# Patient Record
Sex: FEMALE | Race: WHITE | NOT HISPANIC OR LATINO | URBAN - METROPOLITAN AREA
[De-identification: names, ages, dates, MRNs, and addresses within clinical notes are randomized per-mention and may not be internally consistent; named-entity substitution may affect disease eponyms.]

---

## 2020-01-28 LAB
EXT 24 HR UR METANEPHRINE: NORMAL
EXT 24 HR UR NORMETANEPHRINE: NORMAL
EXT 24 HR UR NORMETANEPHRINE: NORMAL
EXT 25 HYDROXY VIT D2: NORMAL
EXT 25 HYDROXY VIT D3: NORMAL
EXT 5 HIAA 24 HR URINE: 4.2 MG/24H (ref 0–6)
EXT 5 HIAA BLOOD: NORMAL
EXT ACTH: NORMAL
EXT AFP: NORMAL
EXT ALBUMIN: NORMAL
EXT ALKALINE PHOSPHATASE: NORMAL
EXT ALT: NORMAL
EXT AMYLASE: NORMAL
EXT ANTI ISLET CELL AB: NORMAL
EXT ANTI PARIETAL CELL AB: NORMAL
EXT ANTI THYROID AB: NORMAL
EXT AST: NORMAL
EXT BILIRUBIN DIRECT: NORMAL
EXT BILIRUBIN TOTAL: NORMAL
EXT BK VIRUS DNA QN PCR: NORMAL
EXT BUN: NORMAL
EXT C PEPTIDE: NORMAL
EXT CA 125: NORMAL
EXT CA 19-9: NORMAL
EXT CA 27-29: NORMAL
EXT CALCITONIN: NORMAL
EXT CALCIUM: NORMAL
EXT CEA: NORMAL
EXT CHLORIDE: NORMAL
EXT CHOLESTEROL: NORMAL
EXT CHROMOGRANIN A: 87 NG/ML (ref 25–140)
EXT CO2: NORMAL
EXT CREATININE UA: NORMAL
EXT CREATININE: NORMAL
EXT CYCLOSPORONE LEVEL: NORMAL
EXT DOPAMINE: NORMAL
EXT EBV DNA BY PCR: NORMAL
EXT EPINEPHRINE: NORMAL
EXT FOLATE: NORMAL
EXT FREE T3: NORMAL
EXT FREE T4: NORMAL
EXT FSH: NORMAL
EXT GASTRIN RELEASING PEPTIDE: NORMAL
EXT GASTRIN RELEASING PEPTIDE: NORMAL
EXT GASTRIN: NORMAL
EXT GGT: NORMAL
EXT GHRELIN: NORMAL
EXT GLUCAGON: NORMAL
EXT GLUCOSE: NORMAL
EXT GROWTH HORMONE: NORMAL
EXT HCV RNA QUANT PCR: NORMAL
EXT HDL: NORMAL
EXT HEMATOCRIT: NORMAL
EXT HEMOGLOBIN A1C: NORMAL
EXT HEMOGLOBIN: NORMAL
EXT HISTAMINE 24 HR URINE: NORMAL
EXT HISTAMINE: NORMAL
EXT IGF-1: NORMAL
EXT IMMUNKNOW (NON-STIMULATED): NORMAL
EXT IMMUNKNOW (STIMULATED): NORMAL
EXT INR: NORMAL
EXT INSULIN: NORMAL
EXT LANREOTIDE LEVEL: NORMAL
EXT LDH, TOTAL: NORMAL
EXT LDL CHOLESTEROL: NORMAL
EXT LIPASE: NORMAL
EXT MAGNESIUM: NORMAL
EXT METANEPHRINE FREE PLASMA: NORMAL
EXT MOTILIN: NORMAL
EXT NEUROKININ A CAMB: NORMAL
EXT NEUROKININ A ISI: NORMAL
EXT NEUROTENSIN: NORMAL
EXT NOREPINEPHRINE: NORMAL
EXT NORMETANEPHRINE: NORMAL
EXT NSE: NORMAL
EXT OCTREOTIDE LEVEL: NORMAL
EXT PANCREASTATIN CAMB: NORMAL
EXT PANCREASTATIN ISI: NORMAL
EXT PANCREATIC POLYPEPTIDE: NORMAL
EXT PHOSPHORUS: NORMAL
EXT PLATELETS: NORMAL
EXT POTASSIUM: NORMAL
EXT PROGRAF LEVEL: NORMAL
EXT PROLACTIN: NORMAL
EXT PROTEIN TOTAL: NORMAL
EXT PROTEIN UA: NORMAL
EXT PT: NORMAL
EXT PTH, INTACT: NORMAL
EXT PTT: NORMAL
EXT RAPAMUNE LEVEL: NORMAL
EXT SEROTONIN: NORMAL
EXT SODIUM: NORMAL
EXT SOMATOSTATIN: NORMAL
EXT SUBSTANCE P: NORMAL
EXT TRIGLYCERIDES: NORMAL
EXT TRYPTASE: NORMAL
EXT TSH: NORMAL
EXT URIC ACID: NORMAL
EXT URINE AMYLASE U/HR: NORMAL
EXT URINE AMYLASE U/L: NORMAL
EXT VASOACTIVE INTESTINAL POLYPEPTIDE: NORMAL
EXT VITAMIN B12: NORMAL
EXT VMA 24 HR URINE: NORMAL
EXT WBC: NORMAL
NEURON SPECIFIC ENOLASE: NORMAL

## 2020-02-18 ENCOUNTER — TELEPHONE (OUTPATIENT)
Dept: HEMATOLOGY/ONCOLOGY | Facility: CLINIC | Age: 40
End: 2020-02-18

## 2020-02-18 ENCOUNTER — TELEPHONE (OUTPATIENT)
Dept: NEUROLOGY | Facility: HOSPITAL | Age: 40
End: 2020-02-18

## 2020-02-18 ENCOUNTER — NURSE TRIAGE (OUTPATIENT)
Dept: ADMINISTRATIVE | Facility: CLINIC | Age: 40
End: 2020-02-18

## 2020-02-18 DIAGNOSIS — C7A.026 MALIGNANT CARCINOID TUMOR OF RECTUM: Primary | ICD-10-CM

## 2020-02-18 RX ORDER — PROGESTERONE 200 MG/1
200 CAPSULE ORAL DAILY
COMMUNITY

## 2020-02-18 RX ORDER — MELATONIN 3 MG
CAPSULE ORAL
COMMUNITY

## 2020-02-18 RX ORDER — OMEPRAZOLE 10 MG/1
10 CAPSULE, DELAYED RELEASE ORAL DAILY
COMMUNITY

## 2020-02-18 RX ORDER — SUMATRIPTAN 50 MG/1
50 TABLET, FILM COATED ORAL
COMMUNITY

## 2020-02-18 NOTE — TELEPHONE ENCOUNTER
----- Message from Hanny Rice RN sent at 2/18/2020  3:04 PM CST -----  Contact: Jessica Christie (mother)  Hi, This a referral for a newly diagnosed Neuroendocrine tumor.    Hanny  ----- Message -----  From: Bre Escalera  Sent: 2/18/2020   2:27 PM CST  To: [facility]    Ms. Christie is calling to schedule a NP appt.     Diagnosis: Carcinoid tumor    Date of Diagnosis: 02/17/2020  by Dr. Demetrius Benito at [facility] (office).      Ms Christie can be reached at [phone] regarding this message.    Thanks.

## 2020-02-18 NOTE — TELEPHONE ENCOUNTER
Spoke with patient's Mother regarding the referral.  Patient would like an appointment with Neuroendocrine clinic in [place].  Referral forwarded to Neuroendocrine clinical Support.

## 2020-02-18 NOTE — TELEPHONE ENCOUNTER
The patient called in to ask about whether she should have a test performed in another state. The patient was advised per nursing judgment and was advised to call back with questions, concerns or symptoms the patient verbalized understanding.     Reason for Disposition   Nursing judgment    Protocols used: NO GUIDELINE OR REFERENCE [id number]

## 2020-02-18 NOTE — TELEPHONE ENCOUNTER
New NET referral from Dr. Demetrius Benito at [facility].  Pt with rectal carcinoid dx 1/2/2020.  Rectal bx showed a well differentiated neuroendocrine tumor, 3mm, tumor invades into submucosa. Ordered Ga 68 PET/CT scan, rectal EUS and path re read per protocol.  Sent to Wanda to schedule.

## 2020-02-19 NOTE — TELEPHONE ENCOUNTER
----- Message from Jimi Goodman sent at 2/19/2020  9:58 AM CST -----  Contact: pt mother  Attn Karen    Please call pt mother at [phone]    Patient mother is returning staff call regarding a future oncology appt but patient is currently uninsured     Patient mother has questions about being uninsured    Thank you

## 2020-02-26 ENCOUNTER — TELEPHONE (OUTPATIENT)
Dept: NEUROLOGY | Facility: HOSPITAL | Age: 40
End: 2020-02-26

## 2020-02-26 NOTE — TELEPHONE ENCOUNTER
We have received the patients EUS & Flex Sig reports so per Dr. Alfaro we are ok to cancel the GA68. Patient verbalized understanding.

## 2020-03-05 NOTE — PROGRESS NOTES
NOLANETS:  [facility]  A collaboration between [facility] and [facility]      PATIENT: Naomy Wiley  MRN: [id number]  DATE: 3/5/2020    Subjective:      Chief Complaint: Consult for rectal neuroendocrine tumor. Referred by Dr. Demetrius Benito at [facility].  Review most recent imaging and blood work results. Establish surveillance and treatment plan.     Overview / HPI: This nice lady has a rectal neuroendocrine tumor diagnosed 2020.  She underwent a colonoscopy because of a history of colonic polyps in her family.  She had had a prior colonoscopy that did not show any worrisome findings. Her most recent colonoscopy noted a small rectal carcinoid.    Interval History:   She had a follow-up endoscopy with endoscopic ultrasound with re-resection of the scar.  The final pathology was consistent with a well-differentiated rectal carcinoid and no residual neuroendocrine tumor cells on the repeat biopsy.       Recent testing includes tumor markers (2020), Ct abd/pelvis (2020), Ga 68 PET/CT scan (none), echocardiogram (none), flex sig (2020), rectal EUS (2020).    Vitals: There were no vitals taken for this visit. --loss 10 pounds over last 2 months    ECOG Score: 0 - Asymptomatic    Oncologic History:   Oncologic History Rectal net- dx 2020   Oncologic Treatment    Pathology 2020- rectal bx- well diff net, 3mm, tumor invades into submucosa     Past Medical History:  Past Medical History:   Diagnosis Date    Depression     Dyslipidemia     GERD (gastroesophageal reflux disease)     HTN (hypertension)     Insomnia     Migraines     Rectal carcinoid tumor        Past Surgical History:  Past Surgical History:   Procedure Laterality Date     SECTION  ,     LAPAROSCOPIC CHOLECYSTECTOMY  2015    TONSILLECTOMY      TYMPANOSTOMY TUBE PLACEMENT         Family History:  No family  history on file.    Allergies:  Carafate [sucralfate]; Cefzil [cefprozil]; Epinephrine; and Latex, natural rubber    Medications:  Current Outpatient Medications   Medication Sig    melatonin 3 mg Cap Take by mouth.    multivitamin capsule Take 1 capsule by mouth once daily.    omeprazole (PRILOSEC) 10 MG capsule Take 10 mg by mouth once daily.    progesterone (PROMETRIUM) 200 MG capsule Take 200 mg by mouth once daily.    sumatriptan (IMITREX) 50 MG tablet Take 50 mg by mouth every 2 (two) hours as needed for Migraine.     No current facility-administered medications for this visit.         Review of Systems   Constitutional: Negative.    HENT: Negative.    Eyes: Negative.    Respiratory: Negative.    Cardiovascular: Negative.    Gastrointestinal: Negative.    Endocrine: Negative.    Genitourinary: Negative.    Musculoskeletal: Negative.    Skin: Negative.    Allergic/Immunologic: Negative.    Neurological: Negative.    Hematological: Negative.    Psychiatric/Behavioral: The patient is nervous/anxious.           Objective:      Physical Exam   Constitutional: She is oriented to person, place, and time. She appears well-developed and well-nourished. No distress.   HENT:   Head: Normocephalic.   Eyes: Pupils are equal, round, and reactive to light.   Cardiovascular: Normal rate.   Pulmonary/Chest: No stridor. No respiratory distress.   Musculoskeletal: She exhibits no edema.   Neurological: She is alert and oriented to person, place, and time.   Skin: Skin is warm and dry. She is not diaphoretic.   Psychiatric: She has a normal mood and affect. Her behavior is normal. Judgment and thought content normal.        Laboratory Data:    Neuroendocrine Labs Latest Ref Rng & Units 3/9/2020 3/9/2020   EXT 5 HIAA 24 HR URINE 0 - 6.0 mg/24h     EXT CHROMOGRANIN A 25 - 140 ng/mL       Neuroendocrine Labs Latest Ref Rng & Units 1/28/2020   EXT 5 HIAA 24 HR URINE 0 - 6.0 mg/24h 4.2   EXT CHROMOGRANIN A 25 - 140 ng/mL 87      Scans:     CT abd/pelvis w contrast- 1/22/20       Colonoscopy- 1/2/20          Flex sig- 2/4/20        Rectal EUS- 2/4/20              Assessment/Impression:         Problem List Items Addressed This Visit     None           Plan:         I had a long conversation with the patient her family about the features of her case that support the treatment and surveillance recommendations outlined below:  1.  She had a well-differentiated subcentimeter rectal carcinoid excised endoscopically in January this year.  Her repeat endoscopy with an endoscopic ultrasound showed no suspicious perirectal lymphadenopathy, and a re-biopsy of the prior resection bed showed no evidence of additional neuroendocrine tumor cells.  I discussed the natural history of rectal carcinoids.  The vast majority of these tumors are noninvasive, rarely spread to local regional lymph nodes, and even more rarely spread to distant metastatic sites. She had no pathologic features that would put her at above-average risk of developing local regional lymphadenopathy or distant metastases.  2.  Endoscopic surveillance is by far the most effective method of surveillance in patients with rectal carcinoids.  The endoscopy with endoscopic ultrasound allows us to evaluate the entirety of the remnant rectum including occult abnormalities within the submucosal layer. It also allows us to evaluate and biopsy regional lymph nodes if necessary.  I would recommend a repeat lower endoscopy with endoscopic ultrasound in 6 months.  3.  She had a CT scan the abdomen pelvis that shows no abnormalities that would suggest lymph node or liver metastases.  The value of the CT in patients with low-grade rectal carcinoids is as a baseline to which future scans could be compared.  In this case it is a pertinent negative but not unexpected.  4.  Someone discussed getting a gallium 68 PET.  A somatostatin receptor PET is quite valuable in patients that have a moderate  likelihood of additional tumors or when symptoms are out of proportion to that which we have found on endoscopy, within her blood work, or cross-sectional imaging.  Although I believe a gallium 68 PET is a valuable negative in someone with a diagnosis of carcinoid tumor, she has been extensively evaluated up to this point.  I would hold on PET imaging until a later date.  I discussed the fact that there are handful of reasons I would reconsider somatostatin receptor PET imagin.  New or worsening symptoms suggestive of a neuroendocrine syndrome, 2.  Changes in blood work that would suggest an evolution of a neuroendocrine pathophysiology, or 3.  Findings on endoscopy that would suggest multifocal disease emergence that would be unusual for carcinoid in this location.    She is coming from [place]. [place] has no Center of Excellence for the care of neuroendocrine patients.  We have often leveraged for patients in her situation to allow for in network coverage for what are considered out of network benefits.  I believe we will be able to partner with 1 or more providers close to her home for ongoing surveillance.  She will remain an established patient with access to all of the resources available within our multi-disciplinary clinic.    Endoscopy with endoscopic ultrasound in 6 months  Neuroendocrine blood work in 6 months  Hold on CT scan  Hold on gallium 68 PET    Almaz Alfaro MD, MPH, FACS  Professor of Surgery, [facility]  Neuroendocrine Surgery, Hepatic/Pancreatic & General Surgical Oncology  [address]  [place]  [address]  ph. [phone]; [phone]  fax. [phone]

## 2020-03-09 ENCOUNTER — OFFICE VISIT (OUTPATIENT)
Dept: NEUROLOGY | Facility: HOSPITAL | Age: 40
End: 2020-03-09
Attending: SURGERY

## 2020-03-09 VITALS
HEIGHT: 65 IN | WEIGHT: 161.19 LBS | SYSTOLIC BLOOD PRESSURE: 141 MMHG | TEMPERATURE: 99 F | HEART RATE: 104 BPM | RESPIRATION RATE: 16 BRPM | DIASTOLIC BLOOD PRESSURE: 97 MMHG | BODY MASS INDEX: 26.85 KG/M2

## 2020-03-09 DIAGNOSIS — C7A.026 MALIGNANT CARCINOID TUMOR OF RECTUM: ICD-10-CM

## 2020-03-09 PROBLEM — D3A.026 RECTAL CARCINOID TUMOR: Status: ACTIVE | Noted: 2020-03-09

## 2020-03-09 PROCEDURE — 99215 OFFICE O/P EST HI 40 MIN: CPT | Performed by: SURGERY

## 2020-03-09 RX ORDER — MELATONIN 5 MG
CAPSULE ORAL
COMMUNITY

## 2020-03-09 RX ORDER — UBIDECARENONE 75 MG
CAPSULE ORAL
COMMUNITY

## 2020-03-09 RX ORDER — IBUPROFEN 100 MG/5ML
SUSPENSION, ORAL (FINAL DOSE FORM) ORAL
COMMUNITY

## 2020-03-09 RX ORDER — FAMOTIDINE 20 MG/1
TABLET, FILM COATED ORAL
COMMUNITY

## 2020-03-09 RX ORDER — SUMATRIPTAN SUCCINATE 100 MG/1
1 TABLET ORAL
COMMUNITY

## 2020-03-09 RX ORDER — PYRIDOXINE HCL (VITAMIN B6) 100 MG
TABLET ORAL
COMMUNITY

## 2020-03-09 RX ORDER — SERTRALINE HYDROCHLORIDE 50 MG/1
TABLET, FILM COATED ORAL
COMMUNITY

## 2020-03-09 RX ORDER — OMEPRAZOLE 20 MG/1
CAPSULE, DELAYED RELEASE ORAL
COMMUNITY

## 2020-03-09 RX ORDER — CYANOCOBALAMIN (VITAMIN B-12) 1000 MCG
TABLET, EXTENDED RELEASE ORAL
COMMUNITY

## 2020-03-09 RX ORDER — ASCORBIC ACID 1000 MG
TABLET ORAL
COMMUNITY
End: 2022-03-30

## 2020-03-09 RX ORDER — FOLIC ACID 0.4 MG
TABLET ORAL
COMMUNITY

## 2020-03-09 RX ORDER — PERPHENAZINE/AMITRIPTYLINE HCL 4MG-10MG
TABLET ORAL
COMMUNITY
End: 2022-03-30

## 2020-03-09 NOTE — PATIENT INSTRUCTIONS
Ca will call you tomorrow about a follow up    ------------------------------------------------------------------------------------------------------------------------------------------  Updated: 3/30/20    Blood work / Lab work / Tumor markers: due every 6 mos.  --- due July 2020  Get lab work drawn at least 1 month prior to appointment.    Scans:   Hold on CT scan  Hold on gallium 68 PET     Return Clinic Appointment: in 6 months with Dr. Alfaro - patient to call to schedule an appointment    Echo: not needed at this time    EGD with EUS: due in 6 months --- orders mailed to home address

## 2020-07-24 LAB
EXT 24 HR UR METANEPHRINE: ABNORMAL
EXT 24 HR UR NORMETANEPHRINE: ABNORMAL
EXT 24 HR UR NORMETANEPHRINE: ABNORMAL
EXT 25 HYDROXY VIT D2: ABNORMAL
EXT 25 HYDROXY VIT D3: ABNORMAL
EXT 5 HIAA 24 HR URINE: ABNORMAL
EXT 5 HIAA BLOOD: ABNORMAL
EXT ACTH: ABNORMAL
EXT AFP: ABNORMAL
EXT ALBUMIN: ABNORMAL
EXT ALKALINE PHOSPHATASE: ABNORMAL
EXT ALT: ABNORMAL
EXT AMYLASE: ABNORMAL
EXT ANTI ISLET CELL AB: ABNORMAL
EXT ANTI PARIETAL CELL AB: ABNORMAL
EXT ANTI THYROID AB: ABNORMAL
EXT AST: ABNORMAL
EXT BILIRUBIN DIRECT: ABNORMAL
EXT BILIRUBIN TOTAL: ABNORMAL
EXT BK VIRUS DNA QN PCR: ABNORMAL
EXT BUN: ABNORMAL
EXT C PEPTIDE: ABNORMAL
EXT CA 125: ABNORMAL
EXT CA 19-9: ABNORMAL
EXT CA 27-29: ABNORMAL
EXT CALCITONIN: ABNORMAL
EXT CALCIUM: ABNORMAL
EXT CEA: ABNORMAL
EXT CHLORIDE: ABNORMAL
EXT CHOLESTEROL: ABNORMAL
EXT CHROMOGRANIN A: ABNORMAL
EXT CO2: ABNORMAL
EXT CREATININE UA: ABNORMAL
EXT CREATININE: ABNORMAL
EXT CYCLOSPORONE LEVEL: ABNORMAL
EXT DOPAMINE: ABNORMAL
EXT EBV DNA BY PCR: ABNORMAL
EXT EPINEPHRINE: ABNORMAL
EXT FOLATE: ABNORMAL
EXT FREE T3: ABNORMAL
EXT FREE T4: ABNORMAL
EXT FSH: ABNORMAL
EXT GASTRIN RELEASING PEPTIDE: ABNORMAL
EXT GASTRIN RELEASING PEPTIDE: ABNORMAL
EXT GASTRIN: ABNORMAL
EXT GGT: ABNORMAL
EXT GHRELIN: ABNORMAL
EXT GLUCAGON: ABNORMAL
EXT GLUCOSE: ABNORMAL
EXT GROWTH HORMONE: ABNORMAL
EXT HCV RNA QUANT PCR: ABNORMAL
EXT HDL: ABNORMAL
EXT HEMATOCRIT: ABNORMAL
EXT HEMOGLOBIN A1C: ABNORMAL
EXT HEMOGLOBIN: ABNORMAL
EXT HISTAMINE 24 HR URINE: ABNORMAL
EXT HISTAMINE: ABNORMAL
EXT IGF-1: ABNORMAL
EXT IMMUNKNOW (NON-STIMULATED): ABNORMAL
EXT IMMUNKNOW (STIMULATED): ABNORMAL
EXT INR: ABNORMAL
EXT INSULIN: ABNORMAL
EXT LANREOTIDE LEVEL: ABNORMAL
EXT LDH, TOTAL: ABNORMAL
EXT LDL CHOLESTEROL: ABNORMAL
EXT LIPASE: ABNORMAL
EXT MAGNESIUM: ABNORMAL
EXT METANEPHRINE FREE PLASMA: ABNORMAL
EXT MOTILIN: ABNORMAL
EXT NEUROKININ A CAMB: ABNORMAL
EXT NEUROKININ A ISI: ABNORMAL
EXT NEUROTENSIN: ABNORMAL
EXT NOREPINEPHRINE: ABNORMAL
EXT NORMETANEPHRINE: ABNORMAL
EXT NSE: ABNORMAL
EXT OCTREOTIDE LEVEL: ABNORMAL
EXT PANCREASTATIN CAMB: ABNORMAL
EXT PANCREASTATIN ISI: 526 PG/ML (ref 100–288.7)
EXT PANCREATIC POLYPEPTIDE: ABNORMAL
EXT PHOSPHORUS: ABNORMAL
EXT PLATELETS: ABNORMAL
EXT POTASSIUM: ABNORMAL
EXT PROGRAF LEVEL: ABNORMAL
EXT PROLACTIN: ABNORMAL
EXT PROTEIN TOTAL: ABNORMAL
EXT PROTEIN UA: ABNORMAL
EXT PT: ABNORMAL
EXT PTH, INTACT: ABNORMAL
EXT PTT: ABNORMAL
EXT RAPAMUNE LEVEL: ABNORMAL
EXT SEROTONIN: <10 NG/ML (ref 56–244)
EXT SODIUM: ABNORMAL
EXT SOMATOSTATIN: ABNORMAL
EXT SUBSTANCE P: ABNORMAL
EXT TRIGLYCERIDES: ABNORMAL
EXT TRYPTASE: ABNORMAL
EXT TSH: ABNORMAL
EXT URIC ACID: ABNORMAL
EXT URINE AMYLASE U/HR: ABNORMAL
EXT URINE AMYLASE U/L: ABNORMAL
EXT VASOACTIVE INTESTINAL POLYPEPTIDE: ABNORMAL
EXT VITAMIN B12: ABNORMAL
EXT VMA 24 HR URINE: ABNORMAL
EXT WBC: ABNORMAL
NEURON SPECIFIC ENOLASE: ABNORMAL

## 2020-08-21 LAB
EXT 24 HR UR METANEPHRINE: ABNORMAL
EXT 24 HR UR NORMETANEPHRINE: ABNORMAL
EXT 24 HR UR NORMETANEPHRINE: ABNORMAL
EXT 25 HYDROXY VIT D2: ABNORMAL
EXT 25 HYDROXY VIT D3: ABNORMAL
EXT 5 HIAA 24 HR URINE: ABNORMAL
EXT 5 HIAA BLOOD: ABNORMAL
EXT ACTH: ABNORMAL
EXT AFP: ABNORMAL
EXT ALBUMIN: ABNORMAL
EXT ALKALINE PHOSPHATASE: ABNORMAL
EXT ALT: ABNORMAL
EXT AMYLASE: ABNORMAL
EXT ANTI ISLET CELL AB: ABNORMAL
EXT ANTI PARIETAL CELL AB: ABNORMAL
EXT ANTI THYROID AB: ABNORMAL
EXT AST: ABNORMAL
EXT BILIRUBIN DIRECT: ABNORMAL
EXT BILIRUBIN TOTAL: ABNORMAL
EXT BK VIRUS DNA QN PCR: ABNORMAL
EXT BUN: ABNORMAL
EXT C PEPTIDE: ABNORMAL
EXT CA 125: ABNORMAL
EXT CA 19-9: ABNORMAL
EXT CA 27-29: ABNORMAL
EXT CALCITONIN: ABNORMAL
EXT CALCIUM: ABNORMAL
EXT CEA: ABNORMAL
EXT CHLORIDE: ABNORMAL
EXT CHOLESTEROL: ABNORMAL
EXT CHROMOGRANIN A: ABNORMAL
EXT CO2: ABNORMAL
EXT CREATININE UA: ABNORMAL
EXT CREATININE: ABNORMAL
EXT CYCLOSPORONE LEVEL: ABNORMAL
EXT DOPAMINE: ABNORMAL
EXT EBV DNA BY PCR: ABNORMAL
EXT EPINEPHRINE: ABNORMAL
EXT FOLATE: ABNORMAL
EXT FREE T3: ABNORMAL
EXT FREE T4: ABNORMAL
EXT FSH: ABNORMAL
EXT GASTRIN RELEASING PEPTIDE: ABNORMAL
EXT GASTRIN RELEASING PEPTIDE: ABNORMAL
EXT GASTRIN: ABNORMAL
EXT GGT: ABNORMAL
EXT GHRELIN: ABNORMAL
EXT GLUCAGON: ABNORMAL
EXT GLUCOSE: ABNORMAL
EXT GROWTH HORMONE: ABNORMAL
EXT HCV RNA QUANT PCR: ABNORMAL
EXT HDL: ABNORMAL
EXT HEMATOCRIT: ABNORMAL
EXT HEMOGLOBIN A1C: ABNORMAL
EXT HEMOGLOBIN: ABNORMAL
EXT HISTAMINE 24 HR URINE: ABNORMAL
EXT HISTAMINE: ABNORMAL
EXT IGF-1: ABNORMAL
EXT IMMUNKNOW (NON-STIMULATED): ABNORMAL
EXT IMMUNKNOW (STIMULATED): ABNORMAL
EXT INR: ABNORMAL
EXT INSULIN: ABNORMAL
EXT LANREOTIDE LEVEL: ABNORMAL
EXT LDH, TOTAL: ABNORMAL
EXT LDL CHOLESTEROL: ABNORMAL
EXT LIPASE: ABNORMAL
EXT MAGNESIUM: ABNORMAL
EXT METANEPHRINE FREE PLASMA: ABNORMAL
EXT MOTILIN: ABNORMAL
EXT NEUROKININ A CAMB: ABNORMAL
EXT NEUROKININ A ISI: ABNORMAL
EXT NEUROTENSIN: ABNORMAL
EXT NOREPINEPHRINE: ABNORMAL
EXT NORMETANEPHRINE: ABNORMAL
EXT NSE: ABNORMAL
EXT OCTREOTIDE LEVEL: ABNORMAL
EXT PANCREASTATIN CAMB: ABNORMAL
EXT PANCREASTATIN ISI: 54 PG/ML (ref 73–349)
EXT PANCREATIC POLYPEPTIDE: ABNORMAL
EXT PHOSPHORUS: ABNORMAL
EXT PLATELETS: ABNORMAL
EXT POTASSIUM: ABNORMAL
EXT PROGRAF LEVEL: ABNORMAL
EXT PROLACTIN: ABNORMAL
EXT PROTEIN TOTAL: ABNORMAL
EXT PROTEIN UA: ABNORMAL
EXT PT: ABNORMAL
EXT PTH, INTACT: ABNORMAL
EXT PTT: ABNORMAL
EXT RAPAMUNE LEVEL: ABNORMAL
EXT SEROTONIN: 11 NG/ML (ref 0–420)
EXT SODIUM: ABNORMAL
EXT SOMATOSTATIN: ABNORMAL
EXT SUBSTANCE P: ABNORMAL
EXT TRIGLYCERIDES: ABNORMAL
EXT TRYPTASE: ABNORMAL
EXT TSH: ABNORMAL
EXT URIC ACID: ABNORMAL
EXT URINE AMYLASE U/HR: ABNORMAL
EXT URINE AMYLASE U/L: ABNORMAL
EXT VASOACTIVE INTESTINAL POLYPEPTIDE: ABNORMAL
EXT VITAMIN B12: ABNORMAL
EXT VMA 24 HR URINE: ABNORMAL
EXT WBC: ABNORMAL
NEURON SPECIFIC ENOLASE: ABNORMAL

## 2020-09-16 ENCOUNTER — PATIENT MESSAGE (OUTPATIENT)
Dept: NEUROLOGY | Facility: HOSPITAL | Age: 40
End: 2020-09-16

## 2020-10-06 ENCOUNTER — PATIENT MESSAGE (OUTPATIENT)
Dept: NEUROLOGY | Facility: HOSPITAL | Age: 40
End: 2020-10-06

## 2020-10-21 ENCOUNTER — PATIENT MESSAGE (OUTPATIENT)
Dept: NEUROLOGY | Facility: HOSPITAL | Age: 40
End: 2020-10-21

## 2020-10-21 ENCOUNTER — TELEPHONE (OUTPATIENT)
Dept: NEUROLOGY | Facility: HOSPITAL | Age: 40
End: 2020-10-21

## 2020-10-21 NOTE — TELEPHONE ENCOUNTER
----- Message from Zora Silverio sent at 10/21/2020  9:49 AM CDT -----  Contact: self [phone]  MM - Patient is returning your call. Please call

## 2020-10-30 ENCOUNTER — OFFICE VISIT (OUTPATIENT)
Dept: NEUROLOGY | Facility: HOSPITAL | Age: 40
End: 2020-10-30
Attending: SURGERY

## 2020-10-30 DIAGNOSIS — C7A.026 MALIGNANT CARCINOID TUMOR OF RECTUM: Primary | ICD-10-CM

## 2020-10-30 NOTE — PROGRESS NOTES
NOLANETS:  [facility]  A collaboration between [facility] and [facility]      PATIENT: Naomy Wiley  MRN: [id number]  DATE: 10/30/2020    Subjective:      Chief Complaint: Consult for rectal neuroendocrine tumor. Referred by Dr. Demetrius Benito at [facility].  Review most recent imaging and blood work results. Establish surveillance and treatment plan.     Overview / HPI: This nice lady has a rectal neuroendocrine tumor diagnosed 2020.  She underwent a colonoscopy because of a history of colonic polyps in her family.  She had had a prior colonoscopy that did not show any worrisome findings. Her most recent colonoscopy noted a small rectal carcinoid.    Interval History:   She had a follow-up endoscopy with endoscopic ultrasound with re-resection of the scar.  The final pathology was consistent with a well-differentiated rectal carcinoid and no residual neuroendocrine tumor cells on the repeat biopsy.       Vitals: There were no vitals taken for this visit. --loss 10 pounds over last 2 months    ECOG Score: 0 - Asymptomatic    Oncologic History:   Oncologic History Rectal net- dx 2020   Oncologic Treatment    Pathology 2020- rectal bx- well diff net, 3mm, tumor invades into submucosa     Past Medical History:  Past Medical History:   Diagnosis Date    Depression     Dyslipidemia     GERD (gastroesophageal reflux disease)     HTN (hypertension)     Insomnia     Migraines     Rectal carcinoid tumor 2020    well diff, 3mm       Past Surgical History:  Past Surgical History:   Procedure Laterality Date     SECTION  ,     LAPAROSCOPIC CHOLECYSTECTOMY  2015    TONSILLECTOMY      TYMPANOSTOMY TUBE PLACEMENT         Family History:  No family history on file.    Allergies:  Carafate [sucralfate]; Cefzil [cefprozil]; Epinephrine; and Latex, natural  rubber    Medications:  Current Outpatient Medications   Medication Sig    ascorbic acid, vitamin C, (VITAMIN C) 1000 MG tablet     coconut oil 1,000 mg Cap     cyanocobalamin (B-12 DOTS) 500 MCG tablet     ergocalciferol, vitamin D2, (VITAMIN D ORAL)     famotidine (PEPCID) 20 MG tablet     folic acid (FOLVITE) 400 MCG tablet     Lactobac no.41/Bifidobact no.7 (PROBIOTIC-10 ORAL)     melatonin 3 mg Cap Take by mouth.    melatonin 5 mg Cap     milk thistle 175 mg tablet     multivitamin (MULTIPLE VITAMINS DAILY ORAL) Take 2 capsules by mouth.    multivitamin capsule Take 1 capsule by mouth once daily.    omega 3-dha-epa-fish oil (FISH OIL) [phone] mg CpDR     omeprazole (PRILOSEC) 10 MG capsule Take 10 mg by mouth once daily.    omeprazole (PRILOSEC) 20 MG capsule     progesterone (PROMETRIUM) 200 MG capsule Take 200 mg by mouth once daily.    pyridoxine, vitamin B6, (B-6) 100 MG Tab     selenium 200 mcg Cap     sertraline (ZOLOFT) 50 MG tablet     sumatriptan (IMITREX) 100 MG tablet Take 1 tablet by mouth.    sumatriptan (IMITREX) 50 MG tablet Take 50 mg by mouth every 2 (two) hours as needed for Migraine.    TURMERIC ORAL      No current facility-administered medications for this visit.         Review of Systems   Constitutional: Negative for fever and unexpected weight change.   HENT: Negative for facial swelling and hearing loss.    Eyes: Negative for photophobia and visual disturbance.   Respiratory: Negative for shortness of breath and stridor.    Cardiovascular: Negative for palpitations.   Gastrointestinal: Negative for abdominal distention, nausea and vomiting.   Endocrine: Negative for cold intolerance.   Genitourinary: Negative for difficulty urinating and dysuria.   Musculoskeletal: Negative for arthralgias and back pain.   Skin: Negative for rash and wound.   Allergic/Immunologic: Negative for food allergies and immunocompromised state.   Neurological: Negative for tremors  and weakness.   Hematological: Does not bruise/bleed easily.   Psychiatric/Behavioral: The patient is nervous/anxious.           Objective:      Physical Exam  Constitutional:       General: She is not in acute distress.     Appearance: She is well-developed. She is not diaphoretic.   HENT:      Head: Normocephalic.   Eyes:      Pupils: Pupils are equal, round, and reactive to light.   Cardiovascular:      Rate and Rhythm: Normal rate.   Pulmonary:      Effort: No respiratory distress.      Breath sounds: No stridor.   Skin:     General: Skin is warm and dry.   Neurological:      Mental Status: She is alert and oriented to person, place, and time.   Psychiatric:         Mood and Affect: Mood normal.         Behavior: Behavior normal.         Thought Content: Thought content normal.         Judgment: Judgment normal.          Laboratory Data:    Neuroendocrine Labs Latest Ref Rng & Units 8/21/2020 7/24/2020   EXT SEROTONIN 0 - 420 ng/mL 11 <10   EXT CHROMOGRANIN A 25 - 140 ng/mL     EXT PANCREASTATIN AIDA 73 - 349 pg/mL 54 (A) 526.0 (A)     pancreastatin natalio labs 7/2020      pancreastatin AIDA 8/2020    **  Neuroendocrine Labs Latest Ref Rng & Units 1/28/2020   EXT 5 HIAA 24 HR URINE 0 - 6.0 mg/24h 4.2   EXT CHROMOGRANIN A 25 - 140 ng/mL 87       Lower endoscopy/EUS/Pathology    Lower EUS 8/2020      Sigmoidoscopy 8/2020        Scans:     CT abd/pelvis w contrast- 1/22/20       Colonoscopy- 1/2/20          Flex sig- 2/4/20        Rectal EUS- 2/4/20              Assessment/Impression:         Problem List Items Addressed This Visit     None           Plan:         I had a long conversation with the patient her family about the features of her case that support the treatment and surveillance recommendations outlined below:  1.  She and I have previously met.  At that time I reviewed with her the natural history of a subcentimeter, well-differentiated rectal carcinoid tumor.  The vast majority of these tumors in  this area are noninvasive, rarely spread to local regional lymph nodes, and even more rarely spread to distant sites.  She had no pathologic features that would put her at above-average risk for local regional recurrence of this tumor.  However her risk of developing additional tumors within the remnant rectum would be the same 's of risk that contributed to the index tumor.  Given her age and no other comorbid conditions that are clear drivers of her mortality, the surveillance recommendations would be annual lower endoscopy up to 5 years after which time we would reassess future surveillance based on any findings.    2.  She had repeat lower endoscopy, evaluation of the prior resection site, and she had no evidence of residual disease or the emergence of additional tumors within the remnant rectum.  Her next lower endoscopy would be scheduled for 1 year    3.  She had a CT scan the abdomen pelvis done previously that shows no abnormalities that would suggest lymph node or liver metastases.  The value of the CT in patients with low-grade rectal carcinoids is as a baseline to which future scans could be compared.  In this case it is a pertinent negative but not unexpected.    She is coming from [place]. [place] has no Center of Excellence for the care of neuroendocrine patients.  We have often leveraged for patients in her situation to allow for in network coverage for what are considered out of network benefits.  She feels comfortable maintaining a virtual relationship and having her surveillance testing and endoscopy done closer to home.  I have cut and pasted the different neuroendocrine lab results to confirm that the initial value from July 2020 was likely related to problems within [facility] lab.  The more reliable lab, AIDA, repeated it.  It is within the normal range.  This is congruent with her endoscopy and cross-sectional imaging suggesting no evidence of active disease.  We need to request that  her future neuroendocrine blood work go to [facility].    Sigmoidoscopy in 1 year.  I do not believe an EUS is necessary unless there are new findings on lower endoscopy.  Neuroendocrine blood work in 1 year  Add Dr Aponte to her care team  Continue to hold on CT cross-sectional imaging  Continue to hold on gallium 68 PET      Almaz Alfaro MD, MPH, FACS  Professor of Surgery, [facility]  Neuroendocrine Surgery, Hepatic/Pancreatic & General Surgical Oncology  [address]  [place]  [address]  ph. [phone]; [phone]  fax. [phone]

## 2021-07-23 ENCOUNTER — PATIENT MESSAGE (OUTPATIENT)
Dept: NEUROLOGY | Facility: HOSPITAL | Age: 41
End: 2021-07-23

## 2021-09-05 ENCOUNTER — PATIENT MESSAGE (OUTPATIENT)
Dept: NEUROLOGY | Facility: HOSPITAL | Age: 41
End: 2021-09-05

## 2021-09-23 ENCOUNTER — TELEPHONE (OUTPATIENT)
Dept: NEUROLOGY | Facility: HOSPITAL | Age: 41
End: 2021-09-23

## 2021-10-05 LAB
EXT 24 HR UR METANEPHRINE: ABNORMAL
EXT 24 HR UR NORMETANEPHRINE: ABNORMAL
EXT 24 HR UR NORMETANEPHRINE: ABNORMAL
EXT 25 HYDROXY VIT D2: ABNORMAL
EXT 25 HYDROXY VIT D3: ABNORMAL
EXT 5 HIAA 24 HR URINE: ABNORMAL
EXT 5 HIAA BLOOD: ABNORMAL
EXT ACTH: ABNORMAL
EXT AFP: ABNORMAL
EXT ALBUMIN: 4.6 G/DL
EXT ALKALINE PHOSPHATASE: 73 IU/L (ref 44–121)
EXT ALT: 19 IU/L
EXT AMYLASE: ABNORMAL
EXT ANTI ISLET CELL AB: ABNORMAL
EXT ANTI PARIETAL CELL AB: ABNORMAL
EXT ANTI THYROID AB: ABNORMAL
EXT AST: 21 IU/L (ref 0–40)
EXT BILIRUBIN DIRECT: ABNORMAL
EXT BILIRUBIN TOTAL: 0.4 MG/DL (ref 0–1.2)
EXT BK VIRUS DNA QN PCR: ABNORMAL
EXT BUN: 7 MG/DL
EXT C PEPTIDE: ABNORMAL
EXT CA 125: ABNORMAL
EXT CA 19-9: ABNORMAL
EXT CA 27-29: ABNORMAL
EXT CALCITONIN: ABNORMAL
EXT CALCIUM: 9.4 MG/DL
EXT CEA: ABNORMAL
EXT CHLORIDE: 103 MMOL/L (ref 96–106)
EXT CHOLESTEROL: ABNORMAL
EXT CHROMOGRANIN A: ABNORMAL
EXT CO2: 20 MMOL/L (ref 20–29)
EXT CREATININE UA: ABNORMAL
EXT CREATININE: 0.83 MG/DL
EXT CYCLOSPORONE LEVEL: ABNORMAL
EXT DOPAMINE: ABNORMAL
EXT EBV DNA BY PCR: ABNORMAL
EXT EPINEPHRINE: ABNORMAL
EXT FOLATE: ABNORMAL
EXT FREE T3: ABNORMAL
EXT FREE T4: ABNORMAL
EXT FSH: ABNORMAL
EXT GASTRIN RELEASING PEPTIDE: ABNORMAL
EXT GASTRIN RELEASING PEPTIDE: ABNORMAL
EXT GASTRIN: ABNORMAL
EXT GGT: ABNORMAL
EXT GHRELIN: ABNORMAL
EXT GLUCAGON: ABNORMAL
EXT GLUCOSE: 108 MG/DL (ref 65–99)
EXT GROWTH HORMONE: ABNORMAL
EXT HCV RNA QUANT PCR: ABNORMAL
EXT HDL: ABNORMAL
EXT HEMATOCRIT: ABNORMAL
EXT HEMOGLOBIN A1C: ABNORMAL
EXT HEMOGLOBIN: ABNORMAL
EXT HISTAMINE 24 HR URINE: ABNORMAL
EXT HISTAMINE: ABNORMAL
EXT IGF-1: ABNORMAL
EXT IMMUNKNOW (NON-STIMULATED): ABNORMAL
EXT IMMUNKNOW (STIMULATED): ABNORMAL
EXT INR: ABNORMAL
EXT INSULIN: ABNORMAL
EXT LANREOTIDE LEVEL: ABNORMAL
EXT LDH, TOTAL: ABNORMAL
EXT LDL CHOLESTEROL: ABNORMAL
EXT LIPASE: ABNORMAL
EXT MAGNESIUM: ABNORMAL
EXT METANEPHRINE FREE PLASMA: ABNORMAL
EXT MOTILIN: ABNORMAL
EXT NEUROKININ A CAMB: ABNORMAL
EXT NEUROKININ A ISI: ABNORMAL
EXT NEUROTENSIN: ABNORMAL
EXT NOREPINEPHRINE: ABNORMAL
EXT NORMETANEPHRINE: ABNORMAL
EXT NSE: ABNORMAL
EXT OCTREOTIDE LEVEL: ABNORMAL
EXT PANCREASTATIN CAMB: ABNORMAL
EXT PANCREASTATIN ISI: ABNORMAL
EXT PANCREATIC POLYPEPTIDE: ABNORMAL
EXT PHOSPHORUS: ABNORMAL
EXT PLATELETS: ABNORMAL
EXT POTASSIUM: 4 MMOL/L (ref 3.5–5.2)
EXT PROGRAF LEVEL: ABNORMAL
EXT PROLACTIN: ABNORMAL
EXT PROTEIN TOTAL: 7 G/DL (ref 6–8.5)
EXT PROTEIN UA: ABNORMAL
EXT PT: ABNORMAL
EXT PTH, INTACT: ABNORMAL
EXT PTT: ABNORMAL
EXT RAPAMUNE LEVEL: ABNORMAL
EXT SEROTONIN: ABNORMAL
EXT SODIUM: 140 MMOL/L (ref 134–144)
EXT SOMATOSTATIN: ABNORMAL
EXT SUBSTANCE P: ABNORMAL
EXT TRIGLYCERIDES: ABNORMAL
EXT TRYPTASE: ABNORMAL
EXT TSH: ABNORMAL
EXT URIC ACID: ABNORMAL
EXT URINE AMYLASE U/HR: ABNORMAL
EXT URINE AMYLASE U/L: ABNORMAL
EXT VASOACTIVE INTESTINAL POLYPEPTIDE: ABNORMAL
EXT VITAMIN B12: ABNORMAL
EXT VMA 24 HR URINE: ABNORMAL
EXT WBC: ABNORMAL
NEURON SPECIFIC ENOLASE: ABNORMAL

## 2021-11-03 ENCOUNTER — TELEPHONE (OUTPATIENT)
Dept: NEUROLOGY | Facility: HOSPITAL | Age: 41
End: 2021-11-03
Payer: COMMERCIAL

## 2021-11-10 ENCOUNTER — TELEPHONE (OUTPATIENT)
Dept: NEUROLOGY | Facility: HOSPITAL | Age: 41
End: 2021-11-10
Payer: COMMERCIAL

## 2021-11-18 ENCOUNTER — PATIENT MESSAGE (OUTPATIENT)
Dept: NEUROLOGY | Facility: HOSPITAL | Age: 41
End: 2021-11-18
Payer: COMMERCIAL

## 2022-01-29 ENCOUNTER — PATIENT MESSAGE (OUTPATIENT)
Dept: NEUROLOGY | Facility: HOSPITAL | Age: 42
End: 2022-01-29
Payer: COMMERCIAL

## 2022-02-18 ENCOUNTER — PATIENT MESSAGE (OUTPATIENT)
Dept: NEUROLOGY | Facility: HOSPITAL | Age: 42
End: 2022-02-18
Payer: COMMERCIAL

## 2022-03-09 ENCOUNTER — PATIENT MESSAGE (OUTPATIENT)
Dept: NEUROLOGY | Facility: HOSPITAL | Age: 42
End: 2022-03-09
Payer: COMMERCIAL

## 2022-03-29 ENCOUNTER — OFFICE VISIT (OUTPATIENT)
Dept: NEUROLOGY | Facility: HOSPITAL | Age: 42
End: 2022-03-29
Attending: SURGERY
Payer: COMMERCIAL

## 2022-03-29 ENCOUNTER — PATIENT MESSAGE (OUTPATIENT)
Dept: NEUROLOGY | Facility: HOSPITAL | Age: 42
End: 2022-03-29

## 2022-03-29 DIAGNOSIS — C7A.026 MALIGNANT CARCINOID TUMOR OF RECTUM: Primary | ICD-10-CM

## 2022-03-29 NOTE — PROGRESS NOTES
[facility]:  [facility]    PATIENT: Naomy Wiley  MRN: [id number]  DATE: 3/30/2022    Subjective:      Chief Complaint: rectal neuroendocrine tumor.     The patient location is: home  The chief complaint leading to consultation is: rectal NET follow up      Face to Face time with patient: 20  30 minutes of total time spent on the encounter, which includes face to face time and non-face to face time preparing to see the patient (eg, review of tests), Obtaining and/or reviewing separately obtained history, Documenting clinical information in the electronic or other health record, Independently interpreting results (not separately reported) and communicating results to the patient/family/caregiver, or Care coordination (not separately reported).     Each patient to whom he or she provides medical services by telemedicine is:  (1) informed of the relationship between the physician and patient and the respective role of any other health care provider with respect to management of the patient; and (2) notified that he or she may decline to receive medical services by telemedicine and may withdraw from such care at any time.    Notes:      Overview / HPI: This nice lady has a rectal neuroendocrine tumor diagnosed 1/2020.  She underwent a colonoscopy because of a history of colonic polyps in her family.  She had had a prior colonoscopy that did not show any worrisome findings. Her most recent colonoscopy noted a small rectal carcinoid.    Interval History:   She had a follow-up endoscopy with endoscopic ultrasound with re-resection of the scar.  The final pathology was consistent with a well-differentiated rectal carcinoid and no residual neuroendocrine tumor cells on the repeat biopsy.       Vitals: There were no vitals taken for this visit. --loss 10 pounds over last 2 months    ECOG Score: 0 - Asymptomatic    Oncologic History:   Oncologic History Rectal net- dx 1/2020    Oncologic Treatment    Pathology 2020- rectal bx- well diff net, 3mm, tumor invades into submucosa     Past Medical History:  Past Medical History:   Diagnosis Date    Depression     Dyslipidemia     GERD (gastroesophageal reflux disease)     HTN (hypertension)     Insomnia     Migraines     Rectal carcinoid tumor 2020    well diff, 3mm       Past Surgical History:  Past Surgical History:   Procedure Laterality Date     SECTION  ,     LAPAROSCOPIC CHOLECYSTECTOMY  2015    TONSILLECTOMY      TYMPANOSTOMY TUBE PLACEMENT         Family History:  No family history on file.    Allergies:  Carafate [sucralfate]; Cefzil [cefprozil]; Epinephrine; and Latex, natural rubber    Medications:  Current Outpatient Medications   Medication Sig    ascorbic acid, vitamin C, (VITAMIN C) 1000 MG tablet     coconut oil 1,000 mg Cap     cyanocobalamin (B-12 DOTS) 500 MCG tablet     ergocalciferol, vitamin D2, (VITAMIN D ORAL)     famotidine (PEPCID) 20 MG tablet     folic acid (FOLVITE) 400 MCG tablet     Lactobac no.41/Bifidobact no.7 (PROBIOTIC-10 ORAL)     melatonin 3 mg Cap Take by mouth.    melatonin 5 mg Cap     milk thistle 175 mg tablet     multivitamin (MULTIPLE VITAMINS DAILY ORAL) Take 2 capsules by mouth.    multivitamin capsule Take 1 capsule by mouth once daily.    omega 3-dha-epa-fish oil (FISH OIL) [phone] mg CpDR     omeprazole (PRILOSEC) 10 MG capsule Take 10 mg by mouth once daily.    omeprazole (PRILOSEC) 20 MG capsule     progesterone (PROMETRIUM) 200 MG capsule Take 200 mg by mouth once daily.    pyridoxine, vitamin B6, (B-6) 100 MG Tab     selenium 200 mcg Cap     sertraline (ZOLOFT) 50 MG tablet     sumatriptan (IMITREX) 100 MG tablet Take 1 tablet by mouth.    sumatriptan (IMITREX) 50 MG tablet Take 50 mg by mouth every 2 (two) hours as needed for Migraine.    TURMERIC ORAL      No current facility-administered medications for this visit.         Review of Systems   Constitutional: Negative for fever and unexpected weight change.   HENT: Negative for facial swelling and hearing loss.    Eyes: Negative for photophobia and visual disturbance.   Respiratory: Negative for shortness of breath and stridor.    Cardiovascular: Negative for palpitations.   Gastrointestinal: Negative for abdominal distention, nausea and vomiting.   Endocrine: Negative for cold intolerance.   Genitourinary: Negative for difficulty urinating and dysuria.   Musculoskeletal: Negative for arthralgias and back pain.   Skin: Negative for rash and wound.   Allergic/Immunologic: Negative for food allergies and immunocompromised state.   Neurological: Negative for tremors and weakness.   Hematological: Does not bruise/bleed easily.   Psychiatric/Behavioral: The patient is nervous/anxious.           Objective:      Physical Exam  Constitutional:       General: She is not in acute distress.     Appearance: She is well-developed. She is not toxic-appearing or diaphoretic.   HENT:      Head: Normocephalic.   Pulmonary:      Effort: Pulmonary effort is normal. No respiratory distress.      Breath sounds: No stridor.   Neurological:      Mental Status: She is alert and oriented to person, place, and time.   Psychiatric:         Mood and Affect: Mood normal.         Behavior: Behavior normal.         Thought Content: Thought content normal.         Judgment: Judgment normal.          Laboratory Data:  Neuroendocrine Labs Latest Ref Rng & Units 10/5/2021   EXT GLUCOSE 65 - 99 mg/dL 108 (A)   BUN 6 - 20 mg/dL    EXT BUN mg/dL 7   CREATININE 0.5 - 1.4 mg/dL    EXT CREATININE mg/dL 0.83    - 145 mmol/L    EXT  - 144 mmol/L 140   K 3.5 - 5.1 mmol/L    EXT K 3.5 - 5.2 mmol/L 4.0   CHLORIDE 95 - 110 mmol/L    EXT CHLORIDE 96 - 106 mmol/L 103   CO2 23 - 29 mmol/L    EXT CO2 20 - 29 mmol/L 20   CALCIUM 8.7 - 10.5 mg/dL    EXT CALCIUM mg/dL 9.4   PROTEIN, TOTAL 6.0 - 8.4 g/dL    EXT PROTEIN,  TOTAL 6.0 - 8.5 g/dL 7.0   ALBUMIN 3.5 - 5.2 g/dL    EXT ALBUMIN g/dL 4.6   TOTAL BILIRUBIN 0.1 - 1.0 mg/dL    EXT TOTAL BILIRUBIN 0.0 - 1.2 mg/dL 0.4   ALK PHOSPHATASE 55 - 135 U/L    EXT ALK PHOSPHATASE 44 - 121 IU/L 73   SGOT (AST) 10 - 40 U/L    EXT SGOT (AST) 0 - 40 IU/L 21   SGPT (ALT) 10 - 44 U/L    EXT ALT IU/L 19       Neuroendocrine Labs Latest Ref Rng & Units 8/21/2020 7/24/2020   EXT SEROTONIN 0 - 420 ng/mL 11 <10   EXT CHROMOGRANIN A 25 - 140 ng/mL     EXT PANCREASTATIN AIDA 73 - 349 pg/mL 54 (A) 526.0 (A)     pancreastatin natalio labs 7/2020      pancreastatin AIDA 8/2020    **  Neuroendocrine Labs Latest Ref Rng & Units 1/28/2020   EXT 5 HIAA 24 HR URINE 0 - 6.0 mg/24h 4.2   EXT CHROMOGRANIN A 25 - 140 ng/mL 87       Lower endoscopy/EUS/Pathology      Lower EUS 8/2020      Sigmoidoscopy 8/2020        Scans:     CT abd/pelvis w contrast- 1/22/20       Colonoscopy- 1/2/20          Flex sig- 2/4/20      Rectal EUS- 2/4/20              Assessment/Impression:         Problem List Items Addressed This Visit        Oncology    Rectal carcinoid tumor - Primary           Plan:         I had a long conversation with the patient her family about the features of her case that support the treatment and surveillance recommendations outlined below:  1.  She and I have previously met.  At that time I reviewed with her the natural history of a subcentimeter, well-differentiated rectal carcinoid tumor.  The vast majority of these tumors in this area are noninvasive, rarely spread to local regional lymph nodes, and even more rarely spread to distant sites.  She had no pathologic features that would put her at above-average risk for local regional recurrence of this tumor.  However her risk of developing additional tumors within the remnant rectum would be the same 's of risk that contributed to the index tumor.  Given her age and no other comorbid conditions that are clear drivers of her mortality, the  surveillance recommendations would be annual lower endoscopy up to 5 years after which time we would reassess future surveillance based on any findings.    2.  She had repeat lower endoscopy, evaluation of the prior resection site, and she had no evidence of residual disease or the emergence of additional tumors within the remnant rectum. A biopsy of the scarred area showed no recurrent NET. Her next lower endoscopy would be scheduled for 1 year    3.  She had a CT scan the abdomen pelvis done previously that shows no abnormalities that would suggest lymph node or liver metastases.  The value of the CT in patients with low-grade rectal carcinoids is as a baseline to which future scans could be compared.  In this case it is a pertinent negative but not unexpected.      Plan:  Sigmoidoscopy in 1 year.  I do not believe an EUS is necessary unless there are new findings on lower endoscopy.  Neuroendocrine blood work in 1 year  Continue to hold on CT cross-sectional imaging  Continue to hold on gallium 68 PET      Almaz Alfaro MD, MPH, FACS  Professor of Surgery, Surgical Oncology, and Hepatobiliary Oncology  Chief, [facility] Division of Surgery Oncology  Medical Director, [facility]  Office: [phone]  Phone: [phone]  Phone: [phone]  Email: [email or web address]   spouse/child(avni)

## 2022-05-20 ENCOUNTER — PATIENT MESSAGE (OUTPATIENT)
Dept: NEUROLOGY | Facility: HOSPITAL | Age: 42
End: 2022-05-20
Payer: COMMERCIAL